# Patient Record
Sex: FEMALE | Race: BLACK OR AFRICAN AMERICAN | NOT HISPANIC OR LATINO | URBAN - METROPOLITAN AREA
[De-identification: names, ages, dates, MRNs, and addresses within clinical notes are randomized per-mention and may not be internally consistent; named-entity substitution may affect disease eponyms.]

---

## 2017-09-17 ENCOUNTER — EMERGENCY (EMERGENCY)
Facility: HOSPITAL | Age: 62
LOS: 1 days | Discharge: OTHER INSTITUTION | End: 2017-09-17
Attending: PHYSICAL MEDICINE & REHABILITATION
Payer: MEDICARE

## 2017-09-17 VITALS
RESPIRATION RATE: 18 BRPM | OXYGEN SATURATION: 95 % | SYSTOLIC BLOOD PRESSURE: 144 MMHG | HEART RATE: 90 BPM | DIASTOLIC BLOOD PRESSURE: 68 MMHG | WEIGHT: 179.9 LBS | TEMPERATURE: 98 F | HEIGHT: 67 IN

## 2017-09-17 DIAGNOSIS — Z98.890 OTHER SPECIFIED POSTPROCEDURAL STATES: Chronic | ICD-10-CM

## 2017-09-17 DIAGNOSIS — O00.90 UNSPECIFIED ECTOPIC PREGNANCY WITHOUT INTRAUTERINE PREGNANCY: Chronic | ICD-10-CM

## 2017-09-17 DIAGNOSIS — F29 UNSPECIFIED PSYCHOSIS NOT DUE TO A SUBSTANCE OR KNOWN PHYSIOLOGICAL CONDITION: ICD-10-CM

## 2017-09-17 LAB
ALBUMIN SERPL ELPH-MCNC: 4.3 G/DL — SIGNIFICANT CHANGE UP (ref 3.3–5.2)
ALP SERPL-CCNC: 92 U/L — SIGNIFICANT CHANGE UP (ref 40–120)
ALT FLD-CCNC: 14 U/L — SIGNIFICANT CHANGE UP
AMPHET UR-MCNC: NEGATIVE — SIGNIFICANT CHANGE UP
ANION GAP SERPL CALC-SCNC: 14 MMOL/L — SIGNIFICANT CHANGE UP (ref 5–17)
APPEARANCE UR: CLEAR — SIGNIFICANT CHANGE UP
AST SERPL-CCNC: 16 U/L — SIGNIFICANT CHANGE UP
BACTERIA # UR AUTO: ABNORMAL
BARBITURATES UR SCN-MCNC: NEGATIVE — SIGNIFICANT CHANGE UP
BASOPHILS # BLD AUTO: 0 K/UL — SIGNIFICANT CHANGE UP (ref 0–0.2)
BASOPHILS NFR BLD AUTO: 0.4 % — SIGNIFICANT CHANGE UP (ref 0–2)
BENZODIAZ UR-MCNC: NEGATIVE — SIGNIFICANT CHANGE UP
BILIRUB SERPL-MCNC: 1 MG/DL — SIGNIFICANT CHANGE UP (ref 0.4–2)
BILIRUB UR-MCNC: NEGATIVE — SIGNIFICANT CHANGE UP
BUN SERPL-MCNC: 11 MG/DL — SIGNIFICANT CHANGE UP (ref 8–20)
CALCIUM SERPL-MCNC: 9.5 MG/DL — SIGNIFICANT CHANGE UP (ref 8.6–10.2)
CHLORIDE SERPL-SCNC: 102 MMOL/L — SIGNIFICANT CHANGE UP (ref 98–107)
CO2 SERPL-SCNC: 25 MMOL/L — SIGNIFICANT CHANGE UP (ref 22–29)
COCAINE METAB.OTHER UR-MCNC: NEGATIVE — SIGNIFICANT CHANGE UP
COLOR SPEC: YELLOW — SIGNIFICANT CHANGE UP
CREAT SERPL-MCNC: 0.62 MG/DL — SIGNIFICANT CHANGE UP (ref 0.5–1.3)
DIFF PNL FLD: NEGATIVE — SIGNIFICANT CHANGE UP
EOSINOPHIL # BLD AUTO: 0.1 K/UL — SIGNIFICANT CHANGE UP (ref 0–0.5)
EOSINOPHIL NFR BLD AUTO: 1.2 % — SIGNIFICANT CHANGE UP (ref 0–6)
EPI CELLS # UR: NEGATIVE — SIGNIFICANT CHANGE UP
GLUCOSE SERPL-MCNC: 99 MG/DL — SIGNIFICANT CHANGE UP (ref 70–115)
GLUCOSE UR QL: NEGATIVE MG/DL — SIGNIFICANT CHANGE UP
HCT VFR BLD CALC: 45.7 % — SIGNIFICANT CHANGE UP (ref 37–47)
HGB BLD-MCNC: 14.8 G/DL — SIGNIFICANT CHANGE UP (ref 12–16)
KETONES UR-MCNC: NEGATIVE — SIGNIFICANT CHANGE UP
LEUKOCYTE ESTERASE UR-ACNC: ABNORMAL
LYMPHOCYTES # BLD AUTO: 2.4 K/UL — SIGNIFICANT CHANGE UP (ref 1–4.8)
LYMPHOCYTES # BLD AUTO: 30.3 % — SIGNIFICANT CHANGE UP (ref 20–55)
MCHC RBC-ENTMCNC: 27.6 PG — SIGNIFICANT CHANGE UP (ref 27–31)
MCHC RBC-ENTMCNC: 32.4 G/DL — SIGNIFICANT CHANGE UP (ref 32–36)
MCV RBC AUTO: 85.1 FL — SIGNIFICANT CHANGE UP (ref 81–99)
METHADONE UR-MCNC: NEGATIVE — SIGNIFICANT CHANGE UP
MONOCYTES # BLD AUTO: 0.5 K/UL — SIGNIFICANT CHANGE UP (ref 0–0.8)
MONOCYTES NFR BLD AUTO: 6.7 % — SIGNIFICANT CHANGE UP (ref 3–10)
NEUTROPHILS # BLD AUTO: 4.8 K/UL — SIGNIFICANT CHANGE UP (ref 1.8–8)
NEUTROPHILS NFR BLD AUTO: 61.1 % — SIGNIFICANT CHANGE UP (ref 37–73)
NITRITE UR-MCNC: POSITIVE
OPIATES UR-MCNC: NEGATIVE — SIGNIFICANT CHANGE UP
PCP SPEC-MCNC: SIGNIFICANT CHANGE UP
PCP UR-MCNC: NEGATIVE — SIGNIFICANT CHANGE UP
PH UR: 6.5 — SIGNIFICANT CHANGE UP (ref 5–8)
PLATELET # BLD AUTO: 254 K/UL — SIGNIFICANT CHANGE UP (ref 150–400)
POTASSIUM SERPL-MCNC: 3.9 MMOL/L — SIGNIFICANT CHANGE UP (ref 3.5–5.3)
POTASSIUM SERPL-SCNC: 3.9 MMOL/L — SIGNIFICANT CHANGE UP (ref 3.5–5.3)
PROT SERPL-MCNC: 8.3 G/DL — SIGNIFICANT CHANGE UP (ref 6.6–8.7)
PROT UR-MCNC: 15 MG/DL
RBC # BLD: 5.37 M/UL — HIGH (ref 4.4–5.2)
RBC # FLD: 14.9 % — SIGNIFICANT CHANGE UP (ref 11–15.6)
SODIUM SERPL-SCNC: 141 MMOL/L — SIGNIFICANT CHANGE UP (ref 135–145)
SP GR SPEC: 1.01 — SIGNIFICANT CHANGE UP (ref 1.01–1.02)
THC UR QL: NEGATIVE — SIGNIFICANT CHANGE UP
UROBILINOGEN FLD QL: NEGATIVE MG/DL — SIGNIFICANT CHANGE UP
WBC # BLD: 7.8 K/UL — SIGNIFICANT CHANGE UP (ref 4.8–10.8)
WBC # FLD AUTO: 7.8 K/UL — SIGNIFICANT CHANGE UP (ref 4.8–10.8)
WBC UR QL: ABNORMAL

## 2017-09-17 PROCEDURE — 71010: CPT | Mod: 26

## 2017-09-17 PROCEDURE — 99285 EMERGENCY DEPT VISIT HI MDM: CPT

## 2017-09-17 PROCEDURE — 70450 CT HEAD/BRAIN W/O DYE: CPT | Mod: 26

## 2017-09-17 PROCEDURE — 93010 ELECTROCARDIOGRAM REPORT: CPT

## 2017-09-17 NOTE — ED BEHAVIORAL HEALTH ASSESSMENT NOTE - OTHER
friend periods of rapid speech periods of being disorganized. delusions no prior / current suicidal ideation/intent/plan; no prior / current suicide attempt poor - fair

## 2017-09-17 NOTE — ED ADULT TRIAGE NOTE - MODE OF ARRIVAL
"Chief Complaint   Patient presents with     Consult     keloid on left ear      /49 (BP Location: Right arm, Patient Position: Dangled, Cuff Size: Child)  Pulse 102  Ht 3' 8.29\" (112.5 cm)  Wt 45 lb 6.6 oz (20.6 kg)  BMI 16.28 kg/m2  Soheila Littlejohn LPN    "
Walk in

## 2017-09-17 NOTE — ED ADULT NURSE NOTE - PMH
COPD (chronic obstructive pulmonary disease)    Degenerative disorder of bone    Diabetes mellitus    HTN (hypertension)    Hyperlipidemia
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
## 2017-09-17 NOTE — ED BEHAVIORAL HEALTH ASSESSMENT NOTE - DIFFERENTIAL
primary psychosis versus psychosis due to general medication condition  R/O Bipolar Affective Disorder, current episode manic  R/O Depressive disorder  R/O Grief response
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
## 2017-09-17 NOTE — ED BEHAVIORAL HEALTH ASSESSMENT NOTE - RISK ASSESSMENT
Patient presents with moderate risk factors including acute psychotic symptoms, paranoia, fixed delusions, medical comorbidities, disabled, poor social supports, unclear psychiatric history, limited insight / judgment, mood lability, and other manic symptoms, no current out-patient psychiatric treatment, no out-patient medical treatment, of which are mitigated by protective factors including no previous suicide attempts, no history of violence, no access to firearms, no history of substance use, positive therapeutic relationships, supportive family, willingness to seek help, no suicidal/homicidal ideations intent or plans, hopefulness for future

## 2017-09-17 NOTE — ED BEHAVIORAL HEALTH ASSESSMENT NOTE - PSYCHIATRIC ISSUES AND PLAN (INCLUDE STANDING AND PRN MEDICATION)
Risperidone 0.5 mg once at bedtime. Haldol 2 mg PO / Haldol 2.5 mg IM, Ativan 2 mg PO/IM, and Benadryl 25 mg PO/IM PRN Q6H for agitation.
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
## 2017-09-17 NOTE — ED BEHAVIORAL HEALTH ASSESSMENT NOTE - SUMMARY
62 year-old  female, disabled, domiciled with childhood friend, with prior psychiatric diagnosis of ADD, however no other prior psychiatric history / treatment, no prior in-patient hospitalization, no prior self-injurious behaviors / suicidal ideation/intent/plan, no prior aggression / legal problems, no prior substance use, no prior abuse / trauma, no medical history, no family history, was referred by childhood friend and brought in by self for delusions and psychotic behaviors.    Patient presents irritable, labile, agitated, paranoid, grandiose (212 IQ), guarded, pressured versus rapid speech, mild - moderately disorganized, with poor - fair relatedness, tangential and overinclusive at times, perseverative, limited historian who is able to provide some information with simple questions and redirection. As per 3D CAM, patient has rambling speech, illogical flow of ideas at times, however was negative for any acute changes and inattention during interview. Hence. Patient is not delirious. Patient has paranoia (with ideas of reference) and Capgras delusions. Patient denies other psychotic symptoms. Patient also presenting with manic symptoms. Family history remains unknown. Patient's own psychiatric history also remains unknown expected for odd / bizarre behaviors in the last few months since the passing of Richard (ex- longtime boyfriend), however has been stable. Patient had recent acute change in mental status, and at this time it is unclear if presentation is primary psychosis or secondary psychosis, due to general medication condition (in case being UTI). Plan is to hold patient, with medical team to provide antibiotics, and also to trial Risperidone 0.5 mg once at bedtime to reassess patient in the morning and assess for resolution (or exacerbation) of symptoms and need for in-patient hospitalization due to acute psychotic episode.

## 2017-09-18 VITALS
SYSTOLIC BLOOD PRESSURE: 131 MMHG | HEART RATE: 71 BPM | OXYGEN SATURATION: 98 % | TEMPERATURE: 98 F | RESPIRATION RATE: 18 BRPM | DIASTOLIC BLOOD PRESSURE: 55 MMHG

## 2017-09-18 PROCEDURE — 80053 COMPREHEN METABOLIC PANEL: CPT

## 2017-09-18 PROCEDURE — 84484 ASSAY OF TROPONIN QUANT: CPT

## 2017-09-18 PROCEDURE — 81001 URINALYSIS AUTO W/SCOPE: CPT

## 2017-09-18 PROCEDURE — 71045 X-RAY EXAM CHEST 1 VIEW: CPT

## 2017-09-18 PROCEDURE — 80307 DRUG TEST PRSMV CHEM ANLYZR: CPT

## 2017-09-18 PROCEDURE — 85027 COMPLETE CBC AUTOMATED: CPT

## 2017-09-18 PROCEDURE — 70450 CT HEAD/BRAIN W/O DYE: CPT

## 2017-09-18 PROCEDURE — 99285 EMERGENCY DEPT VISIT HI MDM: CPT | Mod: 25

## 2017-09-18 PROCEDURE — 36415 COLL VENOUS BLD VENIPUNCTURE: CPT

## 2017-09-18 PROCEDURE — 93005 ELECTROCARDIOGRAM TRACING: CPT

## 2017-09-18 RX ORDER — RISPERIDONE 4 MG/1
0.5 TABLET ORAL ONCE
Qty: 0 | Refills: 0 | Status: COMPLETED | OUTPATIENT
Start: 2017-09-18 | End: 2017-09-18

## 2017-09-18 RX ADMIN — Medication 1 TABLET(S): at 02:53

## 2017-09-18 RX ADMIN — Medication 1 TABLET(S): at 09:05

## 2017-09-18 RX ADMIN — RISPERIDONE 0.5 MILLIGRAM(S): 4 TABLET ORAL at 02:53
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
## 2017-09-18 NOTE — ED BEHAVIORAL HEALTH NOTE - BEHAVIORAL HEALTH NOTE
SW Note: Pt transferred to [facility] for inpt psychiatric care. Accepting MD, Dr. Cabrera. 9.37 legals completed. Msg left for Stacey ( sister in law) [phone]. Ambulance arranged with [facility]. Ins precert completed. Spoke with Ai from [employer] [phone]. Pts ID [id number]. Auth approved for 2 days 9/18/17 & 9/19/17. Auth# [id number]. Review due 9/19/17 with Torri @ [phone]. Info forwarded to [facility] UR dept.

## 2017-09-18 NOTE — ED ADULT NURSE REASSESSMENT NOTE - STATUS
awaiting consult/Pt to be held in the ED for reassessment
awaiting consult
awaiting transfer, no change
reassessment

## 2017-10-19 RX ORDER — TEMAZEPAM 15 MG/1
1 CAPSULE ORAL
Qty: 0 | Refills: 0 | COMMUNITY

## 2017-10-19 RX ORDER — SITAGLIPTIN AND METFORMIN HYDROCHLORIDE 500; 50 MG/1; MG/1
0 TABLET, FILM COATED ORAL
Qty: 0 | Refills: 0 | COMMUNITY

## 2017-10-19 RX ORDER — CARISOPRODOL 250 MG
0 TABLET ORAL
Qty: 0 | Refills: 0 | COMMUNITY

## 2017-10-19 RX ORDER — BUDESONIDE AND FORMOTEROL FUMARATE DIHYDRATE 160; 4.5 UG/1; UG/1
2 AEROSOL RESPIRATORY (INHALATION)
Qty: 0 | Refills: 0 | COMMUNITY

## 2017-10-19 RX ORDER — AMLODIPINE BESYLATE 2.5 MG/1
1 TABLET ORAL
Qty: 0 | Refills: 0 | COMMUNITY

## 2021-07-05 ENCOUNTER — HOSPITAL ENCOUNTER (INPATIENT)
Dept: HOSPITAL 54 - ER | Age: 66
LOS: 17 days | Discharge: HOME | DRG: 885 | End: 2021-07-22
Attending: NURSE PRACTITIONER | Admitting: PSYCHIATRY & NEUROLOGY
Payer: MEDICARE

## 2021-07-05 VITALS — WEIGHT: 200 LBS | HEIGHT: 66 IN | BODY MASS INDEX: 32.14 KG/M2

## 2021-07-05 DIAGNOSIS — R73.9: ICD-10-CM

## 2021-07-05 DIAGNOSIS — F19.10: ICD-10-CM

## 2021-07-05 DIAGNOSIS — Z20.822: ICD-10-CM

## 2021-07-05 DIAGNOSIS — F03.90: ICD-10-CM

## 2021-07-05 DIAGNOSIS — F32.9: ICD-10-CM

## 2021-07-05 DIAGNOSIS — F23: ICD-10-CM

## 2021-07-05 DIAGNOSIS — F29: Primary | ICD-10-CM

## 2021-07-05 DIAGNOSIS — Z91.19: ICD-10-CM

## 2021-07-05 DIAGNOSIS — F41.9: ICD-10-CM

## 2021-07-05 DIAGNOSIS — E44.1: ICD-10-CM

## 2021-07-05 DIAGNOSIS — Z59.0: ICD-10-CM

## 2021-07-05 DIAGNOSIS — E88.09: ICD-10-CM

## 2021-07-05 DIAGNOSIS — E66.9: ICD-10-CM

## 2021-07-05 LAB
ALBUMIN SERPL BCP-MCNC: 3.2 G/DL (ref 3.4–5)
ALP SERPL-CCNC: 93 U/L (ref 46–116)
ALT SERPL W P-5'-P-CCNC: 24 U/L (ref 12–78)
APAP SERPL-MCNC: < 2 UG/ML (ref 10–30)
AST SERPL W P-5'-P-CCNC: 18 U/L (ref 15–37)
BASOPHILS # BLD AUTO: 0 K/UL (ref 0–0.2)
BASOPHILS NFR BLD AUTO: 0.5 % (ref 0–2)
BILIRUB DIRECT SERPL-MCNC: 0.1 MG/DL (ref 0–0.2)
BILIRUB SERPL-MCNC: 0.4 MG/DL (ref 0.2–1)
BILIRUB UR QL STRIP: (no result)
BUN SERPL-MCNC: 12 MG/DL (ref 7–18)
CALCIUM SERPL-MCNC: 8.7 MG/DL (ref 8.5–10.1)
CHLORIDE SERPL-SCNC: 109 MMOL/L (ref 98–107)
CO2 SERPL-SCNC: 26 MMOL/L (ref 21–32)
COLOR UR: YELLOW
CREAT SERPL-MCNC: 0.9 MG/DL (ref 0.6–1.3)
EOSINOPHIL NFR BLD AUTO: 3.2 % (ref 0–6)
ETHANOL SERPL-MCNC: < 3 MG/DL (ref 0–0)
GLUCOSE SERPL-MCNC: 107 MG/DL (ref 74–106)
HCT VFR BLD AUTO: 39 % (ref 33–45)
HGB BLD-MCNC: 12.6 G/DL (ref 11.5–14.8)
LYMPHOCYTES NFR BLD AUTO: 1.4 K/UL (ref 0.8–4.8)
LYMPHOCYTES NFR BLD AUTO: 26.6 % (ref 20–44)
MCHC RBC AUTO-ENTMCNC: 33 G/DL (ref 31–36)
MCV RBC AUTO: 87 FL (ref 82–100)
MONOCYTES NFR BLD AUTO: 0.3 K/UL (ref 0.1–1.3)
MONOCYTES NFR BLD AUTO: 6.2 % (ref 2–12)
NEUTROPHILS # BLD AUTO: 3.3 K/UL (ref 1.8–8.9)
NEUTROPHILS NFR BLD AUTO: 63.5 % (ref 43–81)
PH UR STRIP: 6 [PH] (ref 5–8)
PLATELET # BLD AUTO: 218 K/UL (ref 150–450)
POTASSIUM SERPL-SCNC: 3.8 MMOL/L (ref 3.5–5.1)
PROT SERPL-MCNC: 7.3 G/DL (ref 6.4–8.2)
PROT UR QL STRIP: 100 MG/DL
RBC # BLD AUTO: 4.43 MIL/UL (ref 4–5.2)
RBC #/AREA URNS HPF: (no result) /HPF (ref 0–2)
SODIUM SERPL-SCNC: 144 MMOL/L (ref 136–145)
UROBILINOGEN UR STRIP-MCNC: 0.2 EU/DL
WBC #/AREA URNS HPF: (no result) /HPF (ref 0–3)
WBC NRBC COR # BLD AUTO: 5.3 K/UL (ref 4.3–11)

## 2021-07-05 PROCEDURE — C9803 HOPD COVID-19 SPEC COLLECT: HCPCS

## 2021-07-05 PROCEDURE — G0480 DRUG TEST DEF 1-7 CLASSES: HCPCS

## 2021-07-05 SDOH — ECONOMIC STABILITY - HOUSING INSECURITY: HOMELESSNESS: Z59.0
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
## 2021-07-06 VITALS — SYSTOLIC BLOOD PRESSURE: 137 MMHG | DIASTOLIC BLOOD PRESSURE: 70 MMHG

## 2021-07-06 VITALS — SYSTOLIC BLOOD PRESSURE: 147 MMHG | DIASTOLIC BLOOD PRESSURE: 82 MMHG

## 2021-07-06 VITALS — DIASTOLIC BLOOD PRESSURE: 81 MMHG | SYSTOLIC BLOOD PRESSURE: 156 MMHG

## 2021-07-06 VITALS — SYSTOLIC BLOOD PRESSURE: 130 MMHG | DIASTOLIC BLOOD PRESSURE: 71 MMHG

## 2021-07-06 RX ADMIN — CEPHALEXIN SCH MG: 500 CAPSULE ORAL at 21:29

## 2021-07-06 NOTE — NUR
RN NOTE



PATIENT IS NON COMPLAINT & UNCOOPERATIVE WITH ADMISSION ASSESSMENT PROCESS, REFUSED TO 
ANSWER QUESTIONS. GOT AGITATED, ANXIOUS, RESTLESS & STARTED CRYING, STATED," I SHOULDN'T BE 
HERE." PATIENT REFUSED MRSA SPECIMEN COLLECTION, REFUSED SKIN ASSESSMENT DESPITE OF 
EXPLANATIONS. ONLY ALLOWED FACE PICTURE & BLOOD SUGAR CHECK. PATIENT HAD SNACK, TOLERATED 
WELL & WENT TO SLEEP.
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
## 2021-07-06 NOTE — NUR
RN NOTE



PATIENT REFUSED TO ANSWER & TO GIVE ANY FAMILY INFORMATION TO NOTIFY ABOUT HER ADMISSION AT 
[facility]. THERE IN NO FAMILY INFO FOUND IN PATIENT'S CHART AS WELL.

## 2021-07-07 VITALS — SYSTOLIC BLOOD PRESSURE: 125 MMHG | DIASTOLIC BLOOD PRESSURE: 78 MMHG

## 2021-07-07 VITALS — SYSTOLIC BLOOD PRESSURE: 140 MMHG | DIASTOLIC BLOOD PRESSURE: 74 MMHG

## 2021-07-07 VITALS — DIASTOLIC BLOOD PRESSURE: 84 MMHG | SYSTOLIC BLOOD PRESSURE: 124 MMHG

## 2021-07-07 LAB
ALBUMIN SERPL BCP-MCNC: 2.9 G/DL (ref 3.4–5)
ALP SERPL-CCNC: 93 U/L (ref 46–116)
ALT SERPL W P-5'-P-CCNC: 19 U/L (ref 12–78)
AST SERPL W P-5'-P-CCNC: 18 U/L (ref 15–37)
BILIRUB SERPL-MCNC: 0.7 MG/DL (ref 0.2–1)
BUN SERPL-MCNC: 12 MG/DL (ref 7–18)
CALCIUM SERPL-MCNC: 8.7 MG/DL (ref 8.5–10.1)
CHLORIDE SERPL-SCNC: 106 MMOL/L (ref 98–107)
CHOLEST SERPL-MCNC: 186 MG/DL (ref ?–200)
CO2 SERPL-SCNC: 25 MMOL/L (ref 21–32)
CREAT SERPL-MCNC: 0.6 MG/DL (ref 0.6–1.3)
GLUCOSE SERPL-MCNC: 101 MG/DL (ref 74–106)
HDLC SERPL-MCNC: 60 MG/DL (ref 40–60)
LDLC SERPL DIRECT ASSAY-MCNC: 111 MG/DL (ref 0–99)
POTASSIUM SERPL-SCNC: 4.2 MMOL/L (ref 3.5–5.1)
PROT SERPL-MCNC: 7 G/DL (ref 6.4–8.2)
SODIUM SERPL-SCNC: 139 MMOL/L (ref 136–145)
TRIGL SERPL-MCNC: 80 MG/DL (ref 30–150)

## 2021-07-07 RX ADMIN — CEPHALEXIN SCH MG: 500 CAPSULE ORAL at 08:24

## 2021-07-07 RX ADMIN — CEPHALEXIN SCH MG: 500 CAPSULE ORAL at 21:13

## 2021-07-07 NOTE — NUR
Point of Contact: SW contacted the pts friend, Fercho ([phone]), and was unable to make 
contact. SW left a voicemail and asked for a call back.

## 2021-07-07 NOTE — NUR
GPS-RN NOTES: MED REFUSAL



PATIENT REFUSED SCHEDULED RISPERDAL FOR TONIGHT. DESPITE OF EXPLANATION THE RISKS AND 
BENEFITS PATIENT CONTINUED TO REFUSE. PATIENT STATED "NO,  I DON'T NEED IT" THERE'S NOTHING 
WRONG WITH ME."

## 2021-07-07 NOTE — NUR
Initial Discharge Plan: Pt is currently homeless and states that she will go live with a 
friend. SW will work with the pt and the MD regarding appropriate discharge planning. SW 
will form a safe and proper discharge.

## 2021-07-08 VITALS — SYSTOLIC BLOOD PRESSURE: 144 MMHG | DIASTOLIC BLOOD PRESSURE: 76 MMHG

## 2021-07-08 VITALS — SYSTOLIC BLOOD PRESSURE: 136 MMHG | DIASTOLIC BLOOD PRESSURE: 71 MMHG

## 2021-07-08 VITALS — DIASTOLIC BLOOD PRESSURE: 69 MMHG | SYSTOLIC BLOOD PRESSURE: 132 MMHG

## 2021-07-08 RX ADMIN — CEPHALEXIN SCH MG: 500 CAPSULE ORAL at 21:16

## 2021-07-08 RX ADMIN — CEPHALEXIN SCH MG: 500 CAPSULE ORAL at 08:05

## 2021-07-08 NOTE — NUR
[facility]PS-RN NOTES: MED REFUSAL



PATIENT REFUSED SCHEDULED RISPERDAL FOR TONIGHT. DESPITE OF EXPLANATION THE RISKS AND 
BENEFITS PATIENT CONTINUED TO REFUSE. PATIENT STATED "NO,  I DON'T NEED IT" THERE'S NOTHING 
WRONG WITH ME." DR. HERNANDEZ IN THE UNIT MADE AWARE.

## 2021-07-09 VITALS — SYSTOLIC BLOOD PRESSURE: 140 MMHG | DIASTOLIC BLOOD PRESSURE: 63 MMHG

## 2021-07-09 VITALS — SYSTOLIC BLOOD PRESSURE: 154 MMHG | DIASTOLIC BLOOD PRESSURE: 80 MMHG

## 2021-07-09 VITALS — SYSTOLIC BLOOD PRESSURE: 148 MMHG | DIASTOLIC BLOOD PRESSURE: 63 MMHG

## 2021-07-09 RX ADMIN — CEPHALEXIN SCH MG: 500 CAPSULE ORAL at 21:12

## 2021-07-09 RX ADMIN — CEPHALEXIN SCH MG: 500 CAPSULE ORAL at 07:47

## 2021-07-09 NOTE — NUR
[facility]PS-RN NOTES: MED REFUSAL



PATIENT REFUSED SCHEDULED RISPERDAL PO FOR TONIGHT. ENCOURAGED, DESPITE OF EXPLANATION THE 
RISKS AND BENEFITS PT. CONTINUED TO REFUSE. PATIENT STATED "NO,  I DON'T TAKE IT" THERE'S 
NOTHING WRONG WITH ME." WILL CONTINUE WITH CARE .

## 2021-07-10 VITALS — SYSTOLIC BLOOD PRESSURE: 145 MMHG | DIASTOLIC BLOOD PRESSURE: 65 MMHG

## 2021-07-10 VITALS — DIASTOLIC BLOOD PRESSURE: 92 MMHG | SYSTOLIC BLOOD PRESSURE: 141 MMHG

## 2021-07-10 VITALS — DIASTOLIC BLOOD PRESSURE: 98 MMHG | SYSTOLIC BLOOD PRESSURE: 150 MMHG

## 2021-07-10 VITALS — SYSTOLIC BLOOD PRESSURE: 141 MMHG | DIASTOLIC BLOOD PRESSURE: 92 MMHG

## 2021-07-10 RX ADMIN — CEPHALEXIN SCH MG: 500 CAPSULE ORAL at 08:14

## 2021-07-10 RX ADMIN — CEPHALEXIN SCH MG: 500 CAPSULE ORAL at 21:03

## 2021-07-10 NOTE — NUR
[facility]-RN NOTES: MED REFUSAL



PATIENT REFUSED SCHEDULED RISPERDAL PO FOR TONIGHT at 2100. ENCOURAGED, DESPITE OF 
EXPLANATION THE RISKS AND BENEFITS PT. CONTINUED TO REFUSE. PATIENT STATED "NO, I DON'T NEED 
IT, HOW MANY TIMES I HAVE TOLD YOU ALL THAT I WOULD NOT TAKE THIS MEDICINE." WILL CONTINUE 
WITH CARE .

## 2021-07-11 VITALS — SYSTOLIC BLOOD PRESSURE: 136 MMHG | DIASTOLIC BLOOD PRESSURE: 73 MMHG

## 2021-07-11 VITALS — DIASTOLIC BLOOD PRESSURE: 61 MMHG | SYSTOLIC BLOOD PRESSURE: 134 MMHG

## 2021-07-11 VITALS — SYSTOLIC BLOOD PRESSURE: 133 MMHG | DIASTOLIC BLOOD PRESSURE: 61 MMHG

## 2021-07-11 RX ADMIN — CEPHALEXIN SCH MG: 500 CAPSULE ORAL at 08:30

## 2021-07-11 NOTE — NUR
[facility] RN OPENING NOTES:



RECEIVED PATIENT AWAKE  IN BED, VERBALLY RESPONSIVE,  NO COMPLAIN OF PAIN AND DISCOMFORT AT 
THIS TIME, PATIENT APPEARS CALM, BED IN LOW POSITION , CALL LIGHTS WITHIN REACH, KEPT CLEAN 
AND  DRY, WILL CONTINUE TO MONITOR.

## 2021-07-11 NOTE — NUR
RN NOTES:



PATIENT REFUSED ROUTINE RESPIRDAL STATE THAT SHE HAS AN ALLERGY ON THE MEDICINE, EXPLAINED 
THAT SHE DOES NOT HAVE KNOWN ALLERGY TO THE MEDICINE, OFFERED 2X BUT STRONGLY REFUSED 
MEDICATIONS

## 2021-07-12 VITALS — DIASTOLIC BLOOD PRESSURE: 67 MMHG | SYSTOLIC BLOOD PRESSURE: 135 MMHG

## 2021-07-12 VITALS — DIASTOLIC BLOOD PRESSURE: 69 MMHG | SYSTOLIC BLOOD PRESSURE: 121 MMHG

## 2021-07-12 VITALS — SYSTOLIC BLOOD PRESSURE: 143 MMHG | DIASTOLIC BLOOD PRESSURE: 91 MMHG

## 2021-07-12 RX ADMIN — LORAZEPAM PRN MG: 0.5 TABLET ORAL at 21:42

## 2021-07-12 NOTE — NUR
Contact [facility]: Per pt request, KRYSTA faxed Writ to [facility] (fax # [phone]).

## 2021-07-12 NOTE — NUR
Family Contact: SW called the pts friend, Tyler ([phone]), and left a voicemail 
stating that the SW would like to speak to him regarding the pt.

## 2021-07-12 NOTE — NUR
KRYSTA Referral to [facility]: KRYSTA faxed a referral to [facility] fax #[phone].

## 2021-07-12 NOTE — NUR
Probable Cause Hearing: Pts 5250 hold was upheld for grave disability.  did not find 
probable cause for danger to others.

## 2021-07-12 NOTE — NUR
Family Contact: SW contacted the pts father, Ricardo ([phone]), and was unable to leave 
a voicemail due to the dial tone.

## 2021-07-12 NOTE — NUR
[facility] RN NOTE - ATIVAN



PATIENT REFUSED RISPERIDONE STATING SHE WAS ALLERGIC TO IT AND REACTION INCLUDES COUGHING. 
RN STATED THAT PATIENT DOES NOT HAVE ANY ALLERGIES NOTED IN DOCUMENTATION. OFFERED 
RISPERIDONE X2, PATIENT REFUSED. ADMINISTERED ATIVAN AS REQUESTED AND ORDERED.

## 2021-07-12 NOTE — NUR
RN NOTE- PT ANXIOUS, REFUSING MEDS, ALERT ORIENTED TO PERSON PLACE, PARANOID DELUSIONS, 
WITHDRAWN ISOLATIVE

## 2021-07-12 NOTE — NUR
Family Contact: SW contacted the pts friend, Fercho ([phone]), and was unable to make 
contact and left a voicemail message.

## 2021-07-13 VITALS — SYSTOLIC BLOOD PRESSURE: 124 MMHG | DIASTOLIC BLOOD PRESSURE: 64 MMHG

## 2021-07-13 VITALS — SYSTOLIC BLOOD PRESSURE: 124 MMHG | DIASTOLIC BLOOD PRESSURE: 66 MMHG

## 2021-07-13 RX ADMIN — LORAZEPAM PRN MG: 0.5 TABLET ORAL at 21:05

## 2021-07-13 RX ADMIN — ARIPIPRAZOLE SCH MG: 5 TABLET ORAL at 12:00

## 2021-07-13 RX ADMIN — ARIPIPRAZOLE SCH MG: 5 TABLET ORAL at 23:42

## 2021-07-13 NOTE — NUR
RN NOTE: MEDICATION REFUSAL

PT REFUSED 1300 ABILIFY. PT STATES SHE DOES NOT NEED IT AND THERE IS NOTHING WRONG WITH HER. 
ATTEMPTED TO EDUCATE PT RE IMPORTANCE OF MEDICATION COMPLIANCE AND PURPOSE OF ABILIFY. PT 
CONT'D TO REFUSE X 3. RIESE HEARING TO BE SCHEDULED. WILL CONT TO MONITOR PT FOR SAFETY AND 
BEHAVIOR PER PROTOCOL

## 2021-07-13 NOTE — NUR
[facility] RN CLOSING NOTE



 ALERT ORIENTED TO PERSON PLACE, PARANOID, DELUSIONS, WITHDRAWN ISOLATIVE. PT LYING IN BED. 
TOLERATING ROOM AIR WELL WITH NO SOB. NO S/SX OF PAIN AT THIS TIME. NO ACUTE DISTRESS NOTED. 
NON MED COMPLIANT WITH PSYCH MEDS DURING SHIFT. SAFETY MEASURES IN PLACE: BED IN LOWEST 
LOCKED POSITION, SIDE RAILS UPX2. WILL ENDORSE BERTRAND TO ONCOMING MORNING RN.

## 2021-07-13 NOTE — NUR
[facility] RN NOTE



PATIENT SEEN BY DR. HERNANDEZ: NEW ORDER FOR ABILIFY 5MG PO Q12H AND D/C RISPERDAL 0.5MG PO 
Q12H. 

DR. HERNANDEZ SIGNED RIESE PAPERWORK.

## 2021-07-13 NOTE — NUR
KRYSTA Referral to [facility]: [facility] accepted pt. KRYSTA waiting on discharge date from 
doctor.

## 2021-07-14 VITALS — SYSTOLIC BLOOD PRESSURE: 145 MMHG | DIASTOLIC BLOOD PRESSURE: 69 MMHG

## 2021-07-14 VITALS — DIASTOLIC BLOOD PRESSURE: 61 MMHG | SYSTOLIC BLOOD PRESSURE: 141 MMHG

## 2021-07-14 VITALS — DIASTOLIC BLOOD PRESSURE: 59 MMHG | SYSTOLIC BLOOD PRESSURE: 125 MMHG

## 2021-07-14 RX ADMIN — LORAZEPAM PRN MG: 0.5 TABLET ORAL at 15:52

## 2021-07-14 RX ADMIN — ARIPIPRAZOLE SCH MG: 5 TABLET ORAL at 12:00

## 2021-07-14 NOTE — NUR
RN NOTE- PT TO BE DC TO [facility] BUT WHEN AMBULANCE STAFF ARRIVED, PT REFUSED TO GO TO 
SNF. STATED 'NO ONE TOLD ME I WAS GOING TO FACILITY. I THOUGHT I WAS GOING HOME. " PT BECAME 
AGITATED AND WANTED TO DC TO STREET,.. STATED SHE'D GO BACK TO THE HOUSE IN [place]./ ACCORDING TO HOLD, PT WAS FOUND IN A HOME THAT WAS NOT HER OWN IN [place]. THIS CAUSED CONCERN FOR THIS RN AND I PHONED. DR HERNANDEZ. DR HERNANDEZ STATED THAT 
SHE WAS DC AND THERE WASN'T ANYTHING WE COULD DO. THIS RN FELT STRONGLY ABOUT THE PTS 
POSSIBLE POTENTIAL FOR A RETURN TO THE SAME PROPERTY AND AGGRESSIVE BEHAVIORS. THIS RN 
PHONED DR DAVIS AND ASKED FOR GUIDANCE. DR DAVIS NOTIFIED THIS RN TO CHECK AND SEE IF WRIT 
STILL FILED THAT PT HAD FILED AGAINST [facility]. DISCOVERED WRIT WAS CANCELLED BUT WILL BE 
REFILED. 5250 HOLD STILL IN PLACE. SECURITY NOTIFIED,, STAFF INFORMED PT SHE WAS STILL ON 
HOLD AND SHE WAS GIVEN ATIVAN 1 MG FOR HER AGITATION AND ANXIETY. PT IN ROOM, BED ELEVATED. 
MONITORING PT.

## 2021-07-14 NOTE — NUR
[facility] OPENING NOTE:

PER REPORT FROM HELEN HARRIS NON-COMPLIANT WITH MEDICATION ADMINISTRATION AND PLAN OF CARE. PER 
REPORT DISCHARGE PLANNED FOR TODAY CANCELLED FOR TODAY AND HOLD CONTINUED. AT THIS TIME. 

PT ISOLATIVE AND WITHDRAWN. PARANOID. AT THIS TIME PT 

DENIES SI/HI AT THIS TIME. LAYING IN BED IN NO APPARENT DISTRESS. WILL CONT TO MONITOR FOR 
SAFETY AND BEHAVIOR PER PROTOCOL.

## 2021-07-14 NOTE — NUR
Cancelled Discharge: Pt refused to go [place] [facility] [place] [address] [facility] [address] on 7/14/2021. SW was unable to make homeless discharge because pt 
was going to return to the residence where 5150 was initially made for break in. Pt will 
continue to stay at [facility] for stabilization. WRIT has been refiled on behalf 
of pts by nurses on 7/14/2021. KRYSTA will follow up with MD and pt regarding discharge plan and 
discharge date.

## 2021-07-14 NOTE — NUR
Discharge Note: Pt will be discharged to [facility] ([facility]) located at 
[address] [place] [address]; [phone]). Pt will be transported via [facility] 
at 12PM. SW did not have anyone to contact. Upon discharge, pt appears to be in a dysphoric 
mood and presented with a distressed affect. Pt denies both suicidal and homicidal ideation 
as well as auditory and visual hallucinations. Pt appears to be alert and oriented x3 (time, 
place, and self). Pt appears to be ambulatory with an unsteady gait. Pt appears to be 
disheveled yet appropriately dressed. SW provided patient with the 2020 [facility] Shelter 
Program list. SW provided patient with a copy of the [facility] homeless 
directory which provides information on locations for hot meals, sack lunches, food 
pantries, and showers. SW provided an additional list of mental health clinics: [facility] [address] [place] [address] ([phone]); 
[facility] [address] [place] [address] ([phone]); a list of 
medical clinics; [facility] [address], [place]. [place], 
[phone]; [facility] [address] [facility] [address], 
[place]. [facility] 
[address] [place] [address]: ([phone]). Patient was provided with a 
brief substance abuse intervention and referred to the following substance abuse programs: 
[facility] Substance Abuse Self-helpline ([phone]); [facility] [address] [place] [address] ([phone]); [facility] [address] [facility] [address] ([phone]); [facility] ([phone]); 
[facility] ([phone]); [facility] ([phone]); [facility] ([phone]).   also provided the pt with smoking cessation referrals. Pt 
will continue to be under the care of psychiatrist, Dr. Vines, located at  [address] [place] [address]; [phone] and internist, Dr. Curtis, located at [address] 
[place] [address]; [phone]. The homeless waiver, choice of vendor 
form, and the multidisciplinary exit care form were done, printed, signed, and given to the 
patient.

## 2021-07-15 VITALS — DIASTOLIC BLOOD PRESSURE: 76 MMHG | SYSTOLIC BLOOD PRESSURE: 160 MMHG

## 2021-07-15 VITALS — DIASTOLIC BLOOD PRESSURE: 59 MMHG | SYSTOLIC BLOOD PRESSURE: 132 MMHG

## 2021-07-15 VITALS — SYSTOLIC BLOOD PRESSURE: 160 MMHG | DIASTOLIC BLOOD PRESSURE: 76 MMHG

## 2021-07-15 VITALS — SYSTOLIC BLOOD PRESSURE: 144 MMHG | DIASTOLIC BLOOD PRESSURE: 79 MMHG

## 2021-07-15 VITALS — SYSTOLIC BLOOD PRESSURE: 141 MMHG | DIASTOLIC BLOOD PRESSURE: 65 MMHG

## 2021-07-15 RX ADMIN — ARIPIPRAZOLE SCH MG: 5 TABLET ORAL at 00:00

## 2021-07-15 RX ADMIN — ARIPIPRAZOLE SCH MG: 5 TABLET ORAL at 12:00

## 2021-07-15 NOTE — NUR
RN-NOTES

PATIENT REFUSED ABILIFY 5MG P.O DESPITE EXPLANATIONS RISK AND BENEFITS. PATIENT GETS 
IRRITABLE AND ANGRY. OFFERED X3

## 2021-07-16 VITALS — SYSTOLIC BLOOD PRESSURE: 120 MMHG | DIASTOLIC BLOOD PRESSURE: 71 MMHG

## 2021-07-16 VITALS — DIASTOLIC BLOOD PRESSURE: 69 MMHG | SYSTOLIC BLOOD PRESSURE: 144 MMHG

## 2021-07-16 VITALS — DIASTOLIC BLOOD PRESSURE: 60 MMHG | SYSTOLIC BLOOD PRESSURE: 122 MMHG

## 2021-07-16 RX ADMIN — ARIPIPRAZOLE SCH MG: 5 TABLET ORAL at 12:10

## 2021-07-16 RX ADMIN — ARIPIPRAZOLE SCH MG: 5 TABLET ORAL at 00:00

## 2021-07-16 NOTE — NUR
RN NOTE: MEDICATION REFUSAL



PATIENT REFUSED ABILIFY 5MG P.O AT 0000 DESPITE OF RISK AND BENEFITS EXPLANATIONS. PATIENT 
GETS IRRITABLE AND ANGRY. OFFERED X3

## 2021-07-16 NOTE — NUR
RN-NOTES

PATIENT IN THE ROOM LYING IN  BED,GUARDED,CALM,NO ACUTE DISTRESS NOTED. ENDORSED TO THE  
CHARGE NURSE.

## 2021-07-17 VITALS — DIASTOLIC BLOOD PRESSURE: 70 MMHG | SYSTOLIC BLOOD PRESSURE: 157 MMHG

## 2021-07-17 VITALS — DIASTOLIC BLOOD PRESSURE: 64 MMHG | SYSTOLIC BLOOD PRESSURE: 146 MMHG

## 2021-07-17 VITALS — SYSTOLIC BLOOD PRESSURE: 146 MMHG | DIASTOLIC BLOOD PRESSURE: 64 MMHG

## 2021-07-17 VITALS — SYSTOLIC BLOOD PRESSURE: 146 MMHG | DIASTOLIC BLOOD PRESSURE: 68 MMHG

## 2021-07-17 RX ADMIN — ARIPIPRAZOLE SCH MG: 5 TABLET ORAL at 11:57

## 2021-07-17 RX ADMIN — ARIPIPRAZOLE SCH MG: 5 TABLET ORAL at 23:21

## 2021-07-17 RX ADMIN — ARIPIPRAZOLE SCH MG: 5 TABLET ORAL at 01:03

## 2021-07-17 NOTE — NUR
GPS RN NOTES:

PATIENT IS CURRENTLY SLEEPING IN BED. PATIENT SLEPT 8HR THIS SHIFT. NO S/S OF DISTRESS. 
RESPIRATION EVEN AND UNLABORED WITH EQUAL RISE AND FALL OF THE CHEST, ON ROOM AIR. ALL 
PATIENT CARE NEEDS HAVE BEEN MET AT THIS TIME. WILL CONTINUE TO MONITOR Q15MIN FOR SAFETY, 
MOOD AND BEHAVIOR AND ENDORSE TO AM SHIFT.

## 2021-07-18 VITALS — DIASTOLIC BLOOD PRESSURE: 59 MMHG | SYSTOLIC BLOOD PRESSURE: 123 MMHG

## 2021-07-18 VITALS — SYSTOLIC BLOOD PRESSURE: 151 MMHG | DIASTOLIC BLOOD PRESSURE: 63 MMHG

## 2021-07-18 VITALS — SYSTOLIC BLOOD PRESSURE: 128 MMHG | DIASTOLIC BLOOD PRESSURE: 75 MMHG

## 2021-07-18 RX ADMIN — ARIPIPRAZOLE SCH MG: 5 TABLET ORAL at 12:38

## 2021-07-18 NOTE — NUR
RN NOTE- WITHDRAWN, ISOLATIVE TEARFUL AND ALSO CAN BE  IRRITABLE AND ARGUMENTATIVE BEHAVIOR. 
PO INTAKE GOOD WILL CONT.MONITORING FOR SAFETY AND BEHAVIOR.

## 2021-07-19 VITALS — DIASTOLIC BLOOD PRESSURE: 76 MMHG | SYSTOLIC BLOOD PRESSURE: 155 MMHG

## 2021-07-19 VITALS — SYSTOLIC BLOOD PRESSURE: 127 MMHG | DIASTOLIC BLOOD PRESSURE: 76 MMHG

## 2021-07-19 VITALS — DIASTOLIC BLOOD PRESSURE: 60 MMHG | SYSTOLIC BLOOD PRESSURE: 120 MMHG

## 2021-07-19 VITALS — SYSTOLIC BLOOD PRESSURE: 135 MMHG | DIASTOLIC BLOOD PRESSURE: 71 MMHG

## 2021-07-19 RX ADMIN — ARIPIPRAZOLE SCH MG: 5 TABLET ORAL at 21:08

## 2021-07-19 RX ADMIN — ARIPIPRAZOLE SCH MG: 5 TABLET ORAL at 12:10

## 2021-07-19 RX ADMIN — ARIPIPRAZOLE SCH MG: 5 TABLET ORAL at 00:39

## 2021-07-19 NOTE — NUR
RN NOTE- PT SOMEWHAT WITHDRAWN, DENIES SI/HI PO INTAKE GOOD. MED COMPLIANT.  WILL 
CONT.MONITORING FOR SAFETY AND BEHAVIOR.

## 2021-07-20 VITALS — SYSTOLIC BLOOD PRESSURE: 140 MMHG | DIASTOLIC BLOOD PRESSURE: 69 MMHG

## 2021-07-20 VITALS — DIASTOLIC BLOOD PRESSURE: 93 MMHG | SYSTOLIC BLOOD PRESSURE: 153 MMHG

## 2021-07-20 VITALS — SYSTOLIC BLOOD PRESSURE: 127 MMHG | DIASTOLIC BLOOD PRESSURE: 62 MMHG

## 2021-07-20 RX ADMIN — ARIPIPRAZOLE SCH MG: 5 TABLET ORAL at 08:38

## 2021-07-20 RX ADMIN — ARIPIPRAZOLE SCH MG: 5 TABLET ORAL at 20:40

## 2021-07-20 NOTE — NUR
RN CLOSING NOTE



PATIENT SLEEPING IN ROOM, NO S/S OF DISTRESS OR SOB NOTED, BREATHING EVEN AND UNLABORED. PT 
WITHDRAWN, ISOLATIVE AND TEARFUL AT THE BEGINNING OF SHIFT. PATIENT WAS MED COMPLIANT. 
MEDICATIONS GIVEN AS ORDERED. PATIENT NEEDS MET THROUGHOUT SHIFT. PATIENT SLEPT 8 HRS. 
SAFETY MEASURES KEPT IN PLACE: BED LOCKED IN LOWEST POSITION, SIDE RAILS UP X 2, Q15 MIN 
CHECKS FOR SAFETY BY STAFF. WILL ENDORSE TO DAY SHIFT NURSE FOR CONTINUITY OF CARE

## 2021-07-21 VITALS — DIASTOLIC BLOOD PRESSURE: 72 MMHG | SYSTOLIC BLOOD PRESSURE: 149 MMHG

## 2021-07-21 VITALS — SYSTOLIC BLOOD PRESSURE: 143 MMHG | DIASTOLIC BLOOD PRESSURE: 81 MMHG

## 2021-07-21 VITALS — DIASTOLIC BLOOD PRESSURE: 71 MMHG | SYSTOLIC BLOOD PRESSURE: 142 MMHG

## 2021-07-21 VITALS — SYSTOLIC BLOOD PRESSURE: 142 MMHG | DIASTOLIC BLOOD PRESSURE: 72 MMHG

## 2021-07-21 RX ADMIN — ARIPIPRAZOLE SCH MG: 5 TABLET ORAL at 21:22

## 2021-07-21 RX ADMIN — ARIPIPRAZOLE SCH MG: 5 TABLET ORAL at 10:43

## 2021-07-21 NOTE — NUR
Point of contact: Pt was contacted by pts friend Imelda ([phone]) who reports that pt 
cannot stay with her in [place]. Imelda reports that she is currently in [place]. SW will 
follow up with pt and MD regarding alternative discharge plans.

## 2021-07-21 NOTE — NUR
Point of contact: KRYSTA was contacted by pts friend Fercho who reports that pt can be discharged 
back to his home located at [address] [place] [address]. Fercho reports that he may 
be able to  pt from hospital upon discharge. KRYSTA will keep Fercho updated about 
discharge date.

## 2021-07-21 NOTE — NUR
Point of contact: KRYSTA was contacted by pts friend Fercho ([phone]) to inform him of pts 
discharge and inquire if he can pick pt up upon discharge. SW left a voicemail and will 
attempt to call pts friend at a later time.

## 2021-07-22 VITALS — SYSTOLIC BLOOD PRESSURE: 139 MMHG | DIASTOLIC BLOOD PRESSURE: 66 MMHG

## 2021-07-22 RX ADMIN — ARIPIPRAZOLE SCH MG: 5 TABLET ORAL at 08:17

## 2021-07-22 NOTE — NUR
Discharge: Pt will be discharged back to her friends home located at [address] [place] [id number]. Pts friend Fercho ([phone]) will pick pt up upon discharge at 3PM. Upon 
discharge, the pt appeared to be in a euthymic mood and presented with a calm affect. Pt 
appeared to be alert and oriented x4 (time, place, self and situation). Pt denied both 
suicidal and homicidal ideation as well as auditory and visual hallucinations. . Pt was 
provided with homeless resources such as shelters, food zayas, showers, hot meals, health 
clinics, mental health clinics and substance abuse referrals. SW provided patient with the 
[phone] [facility] Shelter Program list. SW provided patient with a copy of the [facility] homeless directory which provides information on locations for hot 
meals, sack lunches, food pantries, and showers. SW provided an additional list of mental 
health clinics: [facility] [address] [place] [address] ([phone]); [facility] [address] [place] 
[address] ([phone]); a list of medical clinics; [facility] [address] [facility] [address], [place]. [place], [phone]; [facility] [address] 
[facility]. [facility] Provided [facility] [address] [place] [address]: 
([phone]). Patient was provided with a brief substance abuse intervention and referred 
to the following substance abuse programs: [facility] Substance Abuse Self-helpline 
([phone]); [facility] [address] [place] [address] ([phone]); 
[facility] [address] [facility] [address] ([phone]); [facility] 
Rehabilitation Program ([phone]); [facility] ([phone]); [facility] ([phone]); [facility] ([phone]). Pt was referred to [facility] Department of Mental Health located at [address] [facility], [place] 
[address]; [phone]; fax was sent to: [phone], for psychiatric services. Pt was 
also referred to [facility] located at [address] [facility], [place], [place]; [phone]; for medical services. Pt signed the homeless waiver and the multidisciplinary exit 
care form was done, printed, signed, and given to the patient.

## 2021-07-22 NOTE — NUR
Pt. with an order to D/C hold and D/C to friends home and to follow up with psych and 
medical doctors. Social workers provided a mental health clinics and was referred to 
[facility]. Dr. Curtis made aware of the discharge and reconciled the meds. 
Belongings ready and pt. signed the discharge papers. Pt. without distress, denies suicidal 
and homicidal.  

-------------------------------------------------------------------------------

Addendum: 07/22/21 at 1513 by GORAN REYEZ RN

-------------------------------------------------------------------------------

Belongings given and pt. signed the discharge papers.

## 2021-07-22 NOTE — NUR
Pt. left the unit with belongings and being picked up by friend Fercho Nicole. Left without 
distress, ambulatory and escorted by staffs to the lobby. Instructed on meds to continue and 
verbalizes understanding and advised to made a follow up with the psych and medical doctors 
and agreed.